# Patient Record
Sex: MALE | Employment: FULL TIME | URBAN - METROPOLITAN AREA
[De-identification: names, ages, dates, MRNs, and addresses within clinical notes are randomized per-mention and may not be internally consistent; named-entity substitution may affect disease eponyms.]

---

## 2019-11-27 ENCOUNTER — OFFICE VISIT (OUTPATIENT)
Dept: FAMILY MEDICINE | Facility: CLINIC | Age: 34
End: 2019-11-27
Payer: COMMERCIAL

## 2019-11-27 VITALS
DIASTOLIC BLOOD PRESSURE: 84 MMHG | BODY MASS INDEX: 24.95 KG/M2 | HEIGHT: 68 IN | HEART RATE: 77 BPM | SYSTOLIC BLOOD PRESSURE: 125 MMHG | WEIGHT: 164.6 LBS | OXYGEN SATURATION: 96 % | TEMPERATURE: 97.1 F

## 2019-11-27 DIAGNOSIS — Z23 NEED FOR DIPHTHERIA-TETANUS-PERTUSSIS (TDAP) VACCINE: ICD-10-CM

## 2019-11-27 DIAGNOSIS — Z11.1 TUBERCULOSIS SCREENING: ICD-10-CM

## 2019-11-27 DIAGNOSIS — Z11.4 SCREENING FOR HIV (HUMAN IMMUNODEFICIENCY VIRUS): ICD-10-CM

## 2019-11-27 DIAGNOSIS — Z13.1 SCREENING FOR DIABETES MELLITUS: ICD-10-CM

## 2019-11-27 DIAGNOSIS — Z00.00 ROUTINE HISTORY AND PHYSICAL EXAMINATION OF ADULT: Primary | ICD-10-CM

## 2019-11-27 DIAGNOSIS — Z13.220 LIPID SCREENING: ICD-10-CM

## 2019-11-27 LAB
ALBUMIN SERPL-MCNC: 4.2 G/DL (ref 3.4–5)
ALP SERPL-CCNC: 86 U/L (ref 40–150)
ALT SERPL W P-5'-P-CCNC: 40 U/L (ref 0–70)
ANION GAP SERPL CALCULATED.3IONS-SCNC: 5 MMOL/L (ref 3–14)
AST SERPL W P-5'-P-CCNC: 19 U/L (ref 0–45)
BILIRUB SERPL-MCNC: 0.5 MG/DL (ref 0.2–1.3)
BUN SERPL-MCNC: 10 MG/DL (ref 7–30)
CALCIUM SERPL-MCNC: 9 MG/DL (ref 8.5–10.1)
CHLORIDE SERPL-SCNC: 108 MMOL/L (ref 94–109)
CHOLEST SERPL-MCNC: 156 MG/DL
CO2 SERPL-SCNC: 26 MMOL/L (ref 20–32)
CREAT SERPL-MCNC: 0.9 MG/DL (ref 0.66–1.25)
GFR SERPL CREATININE-BSD FRML MDRD: >90 ML/MIN/{1.73_M2}
GLUCOSE SERPL-MCNC: 76 MG/DL (ref 70–99)
HBA1C MFR BLD: 5.4 % (ref 0–5.6)
HDLC SERPL-MCNC: 43 MG/DL
HIV 1+2 AB+HIV1 P24 AG SERPL QL IA: NONREACTIVE
LDLC SERPL CALC-MCNC: 92 MG/DL
NONHDLC SERPL-MCNC: 113 MG/DL
POTASSIUM SERPL-SCNC: 4 MMOL/L (ref 3.4–5.3)
PROT SERPL-MCNC: 7.6 G/DL (ref 6.8–8.8)
SODIUM SERPL-SCNC: 139 MMOL/L (ref 133–144)
TRIGL SERPL-MCNC: 104 MG/DL

## 2019-11-27 PROCEDURE — 87389 HIV-1 AG W/HIV-1&-2 AB AG IA: CPT | Performed by: INTERNAL MEDICINE

## 2019-11-27 PROCEDURE — 36415 COLL VENOUS BLD VENIPUNCTURE: CPT | Performed by: INTERNAL MEDICINE

## 2019-11-27 PROCEDURE — 83036 HEMOGLOBIN GLYCOSYLATED A1C: CPT | Performed by: INTERNAL MEDICINE

## 2019-11-27 PROCEDURE — 80053 COMPREHEN METABOLIC PANEL: CPT | Performed by: INTERNAL MEDICINE

## 2019-11-27 PROCEDURE — 99385 PREV VISIT NEW AGE 18-39: CPT | Mod: 25 | Performed by: INTERNAL MEDICINE

## 2019-11-27 PROCEDURE — 86481 TB AG RESPONSE T-CELL SUSP: CPT | Performed by: INTERNAL MEDICINE

## 2019-11-27 PROCEDURE — 80061 LIPID PANEL: CPT | Performed by: INTERNAL MEDICINE

## 2019-11-27 PROCEDURE — 90471 IMMUNIZATION ADMIN: CPT | Performed by: INTERNAL MEDICINE

## 2019-11-27 PROCEDURE — 90715 TDAP VACCINE 7 YRS/> IM: CPT | Performed by: INTERNAL MEDICINE

## 2019-11-27 SDOH — HEALTH STABILITY: MENTAL HEALTH: HOW OFTEN DO YOU HAVE A DRINK CONTAINING ALCOHOL?: NEVER

## 2019-11-27 ASSESSMENT — ENCOUNTER SYMPTOMS
NEUROLOGICAL NEGATIVE: 1
MUSCULOSKELETAL NEGATIVE: 1
RESPIRATORY NEGATIVE: 1
CONSTITUTIONAL NEGATIVE: 1
ALLERGIC/IMMUNOLOGIC NEGATIVE: 1
GASTROINTESTINAL NEGATIVE: 1
CARDIOVASCULAR NEGATIVE: 1
HEMATOLOGIC/LYMPHATIC NEGATIVE: 1
EYES NEGATIVE: 1
PSYCHIATRIC NEGATIVE: 1
ENDOCRINE NEGATIVE: 1

## 2019-11-27 ASSESSMENT — MIFFLIN-ST. JEOR: SCORE: 1656.12

## 2019-11-27 NOTE — PROGRESS NOTES
SUBJECTIVE:   CC: Mario Ash is an 34 year old male who presents for preventative health visit.     Healthy Habits:    Getting at least 3 servings of Calcium per day:  Yes    Bi-annual eye exam:  Yes    Dental care twice a year:  Yes    Sleep apnea or symptoms of sleep apnea:  None    Diet:  Regular (no restrictions) and Vegetarian/vegan    Frequency of exercise:: Active at home.    Duration of exercise:  Other    Taking medications regularly:  Not Applicable    Barriers to taking medications:  Not applicable    Medication side effects:  Not applicable    PHQ-2 Total Score:    Additional concerns today:  No        Today's PHQ-2 Score:   PHQ-2 ( 1999 Pfizer) 11/27/2019   Q1: Little interest or pleasure in doing things 0   Q2: Feeling down, depressed or hopeless 0   PHQ-2 Score 0       Abuse: Current or Past(Physical, Sexual or Emotional)- No  Do you feel safe in your environment? Yes        Social History     Tobacco Use     Smoking status: Never Smoker     Smokeless tobacco: Never Used   Substance Use Topics     Alcohol use: Never     Frequency: Never     If you drink alcohol do you typically have >3 drinks per day or >7 drinks per week? Not applicable      No flowsheet data found.    Last PSA: No results found for: PSA    Reviewed orders with patient. Reviewed health maintenance and updated orders accordingly - Yes  Lab work is in process  Labs reviewed in EPIC  BP Readings from Last 3 Encounters:   11/27/19 125/84    Wt Readings from Last 3 Encounters:   11/27/19 74.7 kg (164 lb 9.6 oz)                  There is no problem list on file for this patient.    History reviewed. No pertinent surgical history.    Social History     Tobacco Use     Smoking status: Never Smoker     Smokeless tobacco: Never Used   Substance Use Topics     Alcohol use: Never     Frequency: Never     Family History   Problem Relation Age of Onset     Diabetes Mother          No current outpatient medications on file.     No Known  "Allergies  Recent Labs   Lab Test 11/27/19  0909   A1C 5.4   LDL 92   HDL 43   TRIG 104   ALT 40   CR 0.90   GFRESTIMATED >90   GFRESTBLACK >90   POTASSIUM 4.0        Reviewed and updated as needed this visit by clinical staff  Tobacco  Allergies  Meds  Med Hx  Surg Hx  Fam Hx  Soc Hx        Reviewed and updated as needed this visit by Provider  Tobacco  Allergies  Meds  Med Hx  Surg Hx  Fam Hx  Soc Hx       History reviewed. No pertinent past medical history.   History reviewed. No pertinent surgical history.    Review of Systems   Constitutional: Negative.    HENT: Negative.    Eyes: Negative.         WEARS GLASSES FOR NEARSIGHTEDNESS   Respiratory: Negative.    Cardiovascular: Negative.    Gastrointestinal: Negative.    Endocrine: Negative.    Genitourinary: Negative.    Musculoskeletal: Negative.    Skin: Negative.    Allergic/Immunologic: Negative.    Neurological: Negative.    Hematological: Negative.    Psychiatric/Behavioral: Negative.        OBJECTIVE:   /84   Pulse 77   Temp 97.1  F (36.2  C) (Oral)   Ht 1.727 m (5' 8\")   Wt 74.7 kg (164 lb 9.6 oz)   SpO2 96%   BMI 25.03 kg/m      Physical Exam  GENERAL: healthy, alert and no distress  EYES: Eyes grossly normal to inspection, PERRL and conjunctivae and sclerae normal  HENT: ear canals and TM's normal, nose and mouth without ulcers or lesions  NECK: no adenopathy, no asymmetry, masses, or scars and thyroid normal to palpation  RESP: lungs clear to auscultation - no rales, rhonchi or wheezes  CV: regular rate and rhythm, normal S1 S2, no S3 or S4, no murmur, click or rub, no peripheral edema and peripheral pulses strong  ABDOMEN: soft, nontender, no hepatosplenomegaly, no masses and bowel sounds normal   (male): normal male genitalia without lesions or urethral discharge, no hernia, Small white bumps on perenium  MS: no gross musculoskeletal defects noted, no edema  SKIN: no suspicious lesions or rashes  NEURO: Normal strength and " "tone, mentation intact and speech normal  PSYCH: mentation appears normal, affect normal/bright    Diagnostic Test Results:  Labs reviewed in Epic    ASSESSMENT/PLAN:   Mario was seen today for physical.    Diagnoses and all orders for this visit:    Routine history and physical examination of adult  -     Comprehensive metabolic panel (BMP + Alb, Alk Phos, ALT, AST, Total. Bili, TP)    Lipid screening  -     Lipid panel reflex to direct LDL Fasting  -     Comprehensive metabolic panel (BMP + Alb, Alk Phos, ALT, AST, Total. Bili, TP)    Screening for diabetes mellitus  -     Hemoglobin A1c    Tuberculosis screening  -     Quantiferon TB Gold Plus    Need for diphtheria-tetanus-pertussis (Tdap) vaccine  -     TDAP, IM (10 - 64 YRS) - Adacel    Screening for HIV (human immunodeficiency virus)  -     HIV Antigen Antibody Combo        COUNSELING:   Reviewed preventive health counseling, as reflected in patient instructions       Regular exercise       Healthy diet/nutrition       Vision screening       Hearing screening       Immunizations    Vaccinated for: TDAP             Safe sex practices/STD prevention       HIV screeninx in teen years, 1x in adult years, and at intervals if high risk    Estimated body mass index is 25.03 kg/m  as calculated from the following:    Height as of this encounter: 1.727 m (5' 8\").    Weight as of this encounter: 74.7 kg (164 lb 9.6 oz).     Weight management plan: Discussed healthy diet and exercise guidelines     reports that he has never smoked. He has never used smokeless tobacco.      Counseling Resources:  ATP IV Guidelines  Pooled Cohorts Equation Calculator  FRAX Risk Assessment  ICSI Preventive Guidelines  Dietary Guidelines for Americans,   USDA's MyPlate  ASA Prophylaxis  Lung CA Screening    Hyacinth Martinez MD  [facility]  "

## 2019-11-29 LAB
GAMMA INTERFERON BACKGROUND BLD IA-ACNC: 0.15 IU/ML
M TB IFN-G BLD-IMP: POSITIVE
M TB IFN-G CD4+ BCKGRND COR BLD-ACNC: >10 IU/ML
MITOGEN IGNF BCKGRD COR BLD-ACNC: 1.22 IU/ML
MITOGEN IGNF BCKGRD COR BLD-ACNC: 1.28 IU/ML

## 2019-11-30 DIAGNOSIS — R76.11 POSITIVE TB TEST: Primary | ICD-10-CM

## 2019-11-30 NOTE — PROGRESS NOTES
QuantiFERON TB Gold plus test was positive.  Recommendation for patient to do chest x-ray and see infectious disease , referral was put for infectious disease as well as a chest x-ray order.  Patient probably has latent TB and would need to start on treatment.

## 2019-11-30 NOTE — RESULT ENCOUNTER NOTE
Please call patient with the following  results below:  Bravo Lopezant,  I reviewed your labs,   Your screening test for TB [tuberculosis] was positive, which means you have been exposed to TB in past, I recommend you do a Chest Xray and I will refer you to Infectious disease specialist for further management/evaluation.  I will place CXR, please you will need to schedule and will put  referral to infectious disease specialist as well.  HIV test was non reactive [negative]  Your cholesterol panel look fine, HDL good cholesterol was 43, goal is  than 50,   LDL bad cholesterol is low at 92, and Triglyceride is normal at 104, total cholesterol is 156 normal  encourage exercise, weight loss as tolerated, low fat low cholesterol diet  Kidney electrolytes are normal, blood sugar is normal  Liver enzymes are normal and total protein and calcium are normal.  Test for diabetes HbA1c is normal at 5.4.  Any further question happy to address  Dr. Martinez

## 2019-12-02 ENCOUNTER — TELEPHONE (OUTPATIENT)
Dept: FAMILY MEDICINE | Facility: CLINIC | Age: 34
End: 2019-12-02

## 2019-12-04 ENCOUNTER — DOCUMENTATION ONLY (OUTPATIENT)
Dept: LAB | Facility: CLINIC | Age: 34
End: 2019-12-04

## 2019-12-04 DIAGNOSIS — R76.12 POSITIVE QUANTIFERON-TB GOLD TEST: Primary | ICD-10-CM

## 2019-12-04 DIAGNOSIS — R76.12 POSITIVE QUANTIFERON-TB GOLD TEST: ICD-10-CM

## 2019-12-04 PROCEDURE — 86481 TB AG RESPONSE T-CELL SUSP: CPT | Performed by: INTERNAL MEDICINE

## 2019-12-04 PROCEDURE — 36415 COLL VENOUS BLD VENIPUNCTURE: CPT | Performed by: INTERNAL MEDICINE

## 2019-12-04 NOTE — PROGRESS NOTES
Patient has a lab appointment today, but no orders are in.  Patient is requesting for a QTB test.  Please place orders for lab, if needed.  Thank you lab,          ROS      Physical Exam

## 2019-12-04 NOTE — RESULT ENCOUNTER NOTE
This provider discussed abnormal  Blood Tb test result with patient, all questions answered . recommendation given, patient declined CXR and may consider repeat Quantiferon Gold plus test , he will be traveling to [place] soon. See telephone encounter.

## 2019-12-04 NOTE — PROGRESS NOTES
ORDER SIGNED FOR REPEAT QUANTIFERON BLOOD TEST PAR PATIENT REQUEST   Progress Notes by Cristine Andersen MD (John) at 03/19/18 01:49 PM     Author:  Cristine Andersen MD (John) Service:  (none) Author Type:  Physician     Filed:  03/25/18 02:50 PM Encounter Date:  3/19/2018 Status:  Signed     :  Cristine Andersen MD (John) (Physician)               Radha presented to the clinic as scheduled for follow-up.  The pt states that she has been feeling \"well\" for the past 6 months. . She denies having neurovegetative symptoms of depression.  Denies having fatigue with lack of motivation with procrastination anymore. She reports having normal appetite and adequate sleep.    The pt has been taking her meds as directed. When asked about the potential ADRs she denies having dry mouth anymore.   As far as her current main concern[GZ1.1C] she said she is worried about her daughter Magaly who is 2[GZ1.2M]8 YO[GZ1.1C] but she[GZ1.2M]  is not functioning well.  At this point she has no job and she has 2 children from different fathers.  Sammi Erickson and her 2 children[GZ1.1C] used to live[GZ1.2M] with the patient for financial support. Radha  said her daughter Magaly has been unstable, irritable, unreliable and she has been lying. She often sneaks out of the house even staying  outside overnight and Sammi Erickson  does not tell the patient where she is going and what she plans to do outside.  The patient also reported that from time to time she found her daughter had pupils dilation and  conjunctival congestion. Sammi Erickson often goes out and stays inside her car outside for hours and she does not allow patient to enter her room or  check her car.[GZ1.1C]  Recently the pt had a verbal altercation  with her daughter Sammi Erickson, then Sammi Erickson  got upset and took 2 boys  and moved out.   The pt[GZ1.2M]  said she has  4 daughters (Carmen, Enedelia, Zulma and Magaly). They have dysfunctional sibling relationships. She said  her  has retired earlier than planned due to the scandal at [employer]. He  seems to adjust himself well to post-care home life. she is also worried about her  who has multiple physical conditions, including knee replacement surgery and kidney stone. He had surgery to clip the aortic aneurysm in 2016.   He is at age of 68 and he is getting  pensions from [employer] and [employer].    Additionally she  has 4 daughters. She said her daughter -Hailee may have BPD with sexual addictions. She used to be  a swinger and she was trying to seduce close male friends, even her sister's BF, , fiance for sexual relationships. The situation has changed. Hailee has been able to control her impulsivity.   She has  4 daughters (Carmen, Enedelia, Zulma and Asia galvez). Her 28 YO daughter (asia Galvez) has hx of CD with cocaine. She quit using drugs before and she may have relapsed to drugs. . The pt disclosed that her daughter, Hailee has bipolar disorder and she used to use drugs and be a swinger. Now she has been stable and she has stopped using drugs and she has been functioning well. The pt reports that her son-in-law (Jaspal) used to work in [place] as a  for [employer]. he moved back to [place].  Now he is working at [employer] as a manager. She said she is helping her youngest daughter who has 2 children at age 3 and 5. They are living with the pt at this point.  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 She   enjoys gardening. . 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 She has placed her house for rent. Her  retired from   [employer] in maintenance.   She said her  is getting  pensions from [employer] and [employer].      Patient Active Problem List    Diagnosis    • Major depressive disorder, recurrent episode, unspecified   • HTN (hypertension)   • HLD (hyperlipidemia)   • History of depression   • Oral herpes   • Osteopenia   • Hearing loss   • ADD (attention deficit disorder)    • Fibrocystic breast   • Obesity (BMI 30-39.9)   • Asthma   • Posterior vitreous detachment   • Colon polyps   • Internal hemorrhoids   • Family history of breast cancer[GZ1.1C]       Current Outpatient Prescriptions:   •  fluoxetine (PROZAC) 40 MG Cap, Take 1 Cap by mouth daily., Disp: 30 Cap, Rfl: 5  •  buPROPion (WELLBUTRIN XL) 150 MG 24 hr tablet, Take 1 Tab by mouth every morning., Disp: 30 Tab, Rfl: 5  •  amphetamine-dextroamphetamine (ADDERALL XR, 20MG,) 20 MG 24 hr Cap, Take 1 Cap by mouth every morning. Ok to fill on 12/28/17, Disp: 30 Cap, Rfl: 0  •  amphetamine-dextroamphetamine (ADDERALL XR, 20MG,) 20 MG 24 hr Cap, Take 1 Cap by mouth every morning. Ok to fill on 1/28/18, Disp: 30 Cap, Rfl: 0  •  amphetamine-dextroamphetamine (ADDERALL XR, 20MG,) 20 MG 24 hr Cap, Take 1 Cap by mouth every morning. Ok to fill on 2/28/18, Disp: 30 Cap, Rfl: 0  •  Indapamide (LOZOL) 2.5 MG tablet, TAKE 1 TABLET BY MOUTH EVERY DAY, Disp: 90 Tab, Rfl: 0  •  valacyclovir (VALTREX) 500 MG tablet, TAKE 2 TABLETS BY MOUTH DAILY, Disp: 60 Tab, Rfl: 5  •  atorvastatin (LIPITOR) 20 MG tablet, TAKE 1 TABLET BY MOUTH EVERY DAY, Disp: 90 Tab, Rfl: 0  •  PROAIR  (90 BASE) MCG/ACT inhaler, INHALE TWO PUFFS BY MOUTH EVERY 6 HOURS AS NEEDED FOR WHEEZING OR SHORTNESS OF BREATH, Disp: 8.5 g, Rfl: 2  •  Loratadine (CLARITIN) 10 MG CAPS, Take 10 mg by mouth daily., Disp: 30 Cap, Rfl: 0  •  Calcium-Magnesium-Zinc 167-83-8 MG TABS, 1 a day OTC, Disp: 60 Tab, Rfl: 0  •  albuterol (PROVENTIL) (2.5 MG/3ML) 0.083% nebulizer solution, Inhale 3 mL by mouth every 4 (four) hours as needed for Wheezing or Shortness of Breath., Disp: 20 Vial, Rfl: 0  •  Cholecalciferol (VITAMIN D) 2000 UNITS CAPS, ONCE DAILY, Disp: , Rfl: 0  •  Lysine 1000 MG TABS, ONCE DAILY, Disp: , Rfl: 0  •  vitamin B-12 (CYANOCOBALAMIN) 1000 MCG tablet, ONCE DAILY, Disp: , Rfl: 0[GZ1.1T]    MMSE:   Patient is casually dressed with good hygiene and grooming. She shows stable  gait. She is pleasant and cooperative and she presented with clear, coherent speech, her mood is euthymic and affect is calm.  TP is sequential. TC : significant for concerning her daughter's and grandchildren's well-being .  denies having SI. No perceptual disturbance.   Insight and judgement are fair.     ASSESSMENT:  Axis I: Major depressive disorder in remission and dysthymic disorder. R/O F90    PLAN:      Today's session is to focus on the pt education and stress management.[GZ1.1C]   Discussed with the patient about  the symptomatology of borderline personality disorder (BPD), the psychopathology associated with borderline personality and the common coping mechanism associated with BPD  including denial, splitting, projection and lying.[GZ1.2M]   She is assisted to improve her insight that her daughter may have relapsed to drugs. She is  Informed of  the symptom of cocaine intoxication including tachycardia,  hypertension, pupil dilation,  insomnia, restlessness, agitation, pressured speech, paranoid ideation and aggressiveness.    Also discussed  the common symptoms of  cannabis intoxication including conjunctival  injection, increased appetite, dry mouth, and tachycardia.  Discussed with the patient about risks of IV drug abuse.   The patient is encouraged  to closely monitor her daughter's behavior and functioning  including her parenting.  The DCFS should be contacted if she feels that her daughter cannot provide adequate parenting to her grandchildren.  The patient is encouraged to engage her daughter for addiction treatment to help  her maintain sobriety and stay clean.       She is informed of the clinical indications of taking her psychotropics.    She will continue taking   Fluoxetine 40 mg qd. Bupropion 150 mg qd and Adderall 20 mg qd.    Discuss with the pt about the potential ADRs of her psychotropics. She is scheduled to return to Clinic in 6 months for follow-up.      This is a combined session  of medication management psychotherapy, greater than 16 minutes spent in patient education and counseling.[GZ1.1C]     Electronically Signed by:    Cristine Andersen MD (John) , 3/25/2018[GZ1.2T]        Revision History        User Key Date/Time User Provider Type Action    > GZ1.2 03/25/18 02:50 PM Cristine Andersen MD (John) Physician Sign     GZ1.1 03/19/18 01:49 PM Cristine Andersen MD (John) Physician     C - Copied, M - Manual, T - Template

## 2019-12-06 ENCOUNTER — MYC MEDICAL ADVICE (OUTPATIENT)
Dept: FAMILY MEDICINE | Facility: CLINIC | Age: 34
End: 2019-12-06

## 2019-12-06 ENCOUNTER — ANCILLARY PROCEDURE (OUTPATIENT)
Dept: GENERAL RADIOLOGY | Facility: CLINIC | Age: 34
End: 2019-12-06
Attending: INTERNAL MEDICINE
Payer: COMMERCIAL

## 2019-12-06 DIAGNOSIS — R76.11 POSITIVE TB TEST: ICD-10-CM

## 2019-12-06 LAB
GAMMA INTERFERON BACKGROUND BLD IA-ACNC: 0.91 IU/ML
M TB IFN-G BLD-IMP: POSITIVE
M TB IFN-G CD4+ BCKGRND COR BLD-ACNC: 8.8 IU/ML
MITOGEN IGNF BCKGRD COR BLD-ACNC: 1.63 IU/ML
MITOGEN IGNF BCKGRD COR BLD-ACNC: 2.26 IU/ML

## 2019-12-06 PROCEDURE — 71046 X-RAY EXAM CHEST 2 VIEWS: CPT

## 2019-12-06 NOTE — TELEPHONE ENCOUNTER
Hyacinth Martinez MD  [facility] Triage             Please call patient with the following test result   Bravo Martin,   Your QuantiFERON-TB gold plus test result is still positive on repeat, I recommend strongly you follow-up with infectious disease [we can put a referral]  AND you will need a chest x-ray as well.   Latent TB is suspected and you will need treatment for that.   Any further question happy to address   Dr. Martinez      Discussed with patient -faxed over referral to [facility]     Pt will call them and call main number here to schedule x-ray    Judy VILLALTA RN

## 2019-12-06 NOTE — RESULT ENCOUNTER NOTE
Please call patient with the following test result  Bravo Martin,  Your QuantiFERON-TB gold plus test result is still positive on repeat, I recommend strongly you follow-up with infectious disease [we can put a referral]  AND you will need a chest x-ray as well.  Latent TB is suspected and you will need treatment for that.  Any further question happy to address  Dr. Martinez

## 2019-12-08 DIAGNOSIS — R76.12 POSITIVE QUANTIFERON-TB GOLD TEST: Primary | ICD-10-CM

## 2019-12-09 ENCOUNTER — TELEPHONE (OUTPATIENT)
Dept: FAMILY MEDICINE | Facility: CLINIC | Age: 34
End: 2019-12-09

## 2019-12-09 NOTE — RESULT ENCOUNTER NOTE
Bravo Martin,   It is reassuring that your CXR is negative mean there is no active Tuberculosis [TB] infection, or acute lung disease, saying that I still strongly recommend you see infectiosus disease [ID] for treating suspected latent tuberculosis*,  Will put a referral to ID.  Any further questions I am happy to address  Dr Martinez    *Latent tuberculosis infection (LTBI) is a state of persistent immune response to stimulation by Mycobacterium tuberculosis antigens without evidence of clinically manifested active TB. Someone has latent TB if they are infected with the TB bacteria but do not have signs of active TB disease and do not feel ill.

## 2019-12-09 NOTE — TELEPHONE ENCOUNTER
Notes recorded by Chnate Sousa RN on 12/9/2019 at 2:42 PM CST  Left message on answering machine for patient to call back.  Chante OROZCO RN,BSN    ------    Notes recorded by Hyacinth Martinez MD on 12/8/2019 at 6:55 PM CST  Bravo Martin,   It is reassuring that your CXR is negative mean there is no active Tuberculosis [TB] infection, or acute lung disease, saying that I still strongly recommend you see infectiosus disease [ID] for treating suspected latent tuberculosis*,  Will put a referral to ID.  Any further questions I am happy to address  Dr Martinez    *Latent tuberculosis infection (LTBI) is a state of persistent immune response to stimulation by Mycobacterium tuberculosis antigens without evidence of clinically manifested active TB. Someone has latent TB if they are infected with the TB bacteria but do not have signs of active TB disease and do not feel ill.

## 2019-12-11 NOTE — TELEPHONE ENCOUNTER
Attempt #2.  Non-detailed message left for patient to return call  DARREN RodríguezN, RN  [facility]

## 2019-12-11 NOTE — TELEPHONE ENCOUNTER
Patient returned call to triage.     Information given to patient from PCP.  States understood and agreed with advise.  States he will follow up and schedule with Infectious Disease.      Chante Sousa RN

## 2021-01-04 ENCOUNTER — HEALTH MAINTENANCE LETTER (OUTPATIENT)
Age: 36
End: 2021-01-04

## 2021-10-10 ENCOUNTER — HEALTH MAINTENANCE LETTER (OUTPATIENT)
Age: 36
End: 2021-10-10

## 2022-01-30 ENCOUNTER — HEALTH MAINTENANCE LETTER (OUTPATIENT)
Age: 37
End: 2022-01-30

## 2022-09-18 ENCOUNTER — HEALTH MAINTENANCE LETTER (OUTPATIENT)
Age: 37
End: 2022-09-18

## 2023-05-07 ENCOUNTER — HEALTH MAINTENANCE LETTER (OUTPATIENT)
Age: 38
End: 2023-05-07